# Patient Record
Sex: MALE | Race: WHITE | NOT HISPANIC OR LATINO | Employment: FULL TIME | URBAN - METROPOLITAN AREA
[De-identification: names, ages, dates, MRNs, and addresses within clinical notes are randomized per-mention and may not be internally consistent; named-entity substitution may affect disease eponyms.]

---

## 2018-11-14 ENCOUNTER — OFFICE VISIT (OUTPATIENT)
Dept: URGENT CARE | Facility: URGENT CARE | Age: 33
End: 2018-11-14
Payer: COMMERCIAL

## 2018-11-14 ENCOUNTER — RADIANT APPOINTMENT (OUTPATIENT)
Dept: GENERAL RADIOLOGY | Facility: CLINIC | Age: 33
End: 2018-11-14
Attending: FAMILY MEDICINE
Payer: COMMERCIAL

## 2018-11-14 VITALS
RESPIRATION RATE: 15 BRPM | WEIGHT: 216 LBS | DIASTOLIC BLOOD PRESSURE: 87 MMHG | SYSTOLIC BLOOD PRESSURE: 137 MMHG | HEART RATE: 93 BPM | TEMPERATURE: 98.9 F | OXYGEN SATURATION: 97 %

## 2018-11-14 DIAGNOSIS — R13.12 OROPHARYNGEAL DYSPHAGIA: Primary | ICD-10-CM

## 2018-11-14 DIAGNOSIS — R93.89 ABNORMAL X-RAY OF NECK: ICD-10-CM

## 2018-11-14 PROCEDURE — 99203 OFFICE O/P NEW LOW 30 MIN: CPT | Performed by: FAMILY MEDICINE

## 2018-11-14 PROCEDURE — 70360 X-RAY EXAM OF NECK: CPT | Mod: FY

## 2018-11-14 PROCEDURE — 71046 X-RAY EXAM CHEST 2 VIEWS: CPT | Mod: FY

## 2018-11-14 RX ORDER — TRAZODONE HYDROCHLORIDE 100 MG/1
100 TABLET ORAL
COMMUNITY
Start: 2018-11-09

## 2018-11-14 RX ORDER — CLOBETASOL PROPIONATE 0.5 MG/ML
LOTION TOPICAL
Refills: 2 | COMMUNITY
Start: 2018-10-09

## 2018-11-14 RX ORDER — AZITHROMYCIN 250 MG/1
TABLET, FILM COATED ORAL
COMMUNITY
Start: 2018-11-12

## 2018-11-14 RX ORDER — FLUTICASONE PROPIONATE 50 MCG
2 SPRAY, SUSPENSION (ML) NASAL
COMMUNITY
Start: 2018-10-08

## 2018-11-14 RX ORDER — HYDROXYZINE HYDROCHLORIDE 25 MG/1
25 TABLET, FILM COATED ORAL
COMMUNITY
Start: 2018-10-08 | End: 2018-11-14

## 2018-11-14 RX ORDER — ESCITALOPRAM OXALATE 20 MG/1
TABLET ORAL
Refills: 0 | COMMUNITY
Start: 2018-11-09

## 2018-11-14 RX ORDER — SUCRALFATE ORAL 1 G/10ML
1 SUSPENSION ORAL 4 TIMES DAILY
Qty: 420 ML | Refills: 1 | Status: SHIPPED | OUTPATIENT
Start: 2018-11-14

## 2018-11-14 RX ORDER — OMEPRAZOLE 40 MG/1
40 CAPSULE, DELAYED RELEASE ORAL DAILY
Qty: 30 CAPSULE | Refills: 1 | Status: SHIPPED | OUTPATIENT
Start: 2018-11-14

## 2018-11-14 ASSESSMENT — PAIN SCALES - GENERAL: PAINLEVEL: SEVERE PAIN (6)

## 2018-11-14 NOTE — MR AVS SNAPSHOT
"              After Visit Summary   2018    León Williamson    MRN: [id number]           Patient Information     Date Of Birth          1985        Visit Information        Provider Department      2018 8:50 PM Duyen Oconnell MD [facility]        Today's Diagnoses     Oropharyngeal dysphagia    -  1       Follow-ups after your visit        Who to contact     If you have questions or need follow up information about today's clinic visit or your schedule please contact [facility] directly at [phone].  Normal or non-critical lab and imaging results will be communicated to you by Mapadohart, letter or phone within 4 business days after the clinic has received the results. If you do not hear from us within 7 days, please contact the clinic through Mapadohart or phone. If you have a critical or abnormal lab result, we will notify you by phone as soon as possible.  Submit refill requests through [employer] or call your pharmacy and they will forward the refill request to us. Please allow 3 business days for your refill to be completed.          Additional Information About Your Visit        MyChart Information     [employer] lets you send messages to your doctor, view your test results, renew your prescriptions, schedule appointments and more. To sign up, go to [email or web address] . Click on \"Log in\" on the left side of the screen, which will take you to the Welcome page. Then click on \"Sign up Now\" on the right side of the page.     You will be asked to enter the access code listed below, as well as some personal information. Please follow the directions to create your username and password.     Your access code is: [email or web address]  Expires: 2019 10:30 PM     Your access code will  in 90 days. If you need help or a new code, please call your [facility] or [phone].        Care EveryWhere ID     This is your Care EveryWhere ID. This could be used by other " organizations to access your [place] medical records  [id number]        Your Vitals Were     Pulse Temperature Respirations Pulse Oximetry          93 98.9  F (37.2  C) (Oral) 15 97%         Blood Pressure from Last 3 Encounters:   11/14/18 137/87   07/12/11 150/88   07/09/11 123/75    Weight from Last 3 Encounters:   11/14/18 216 lb (98 kg)   07/12/11 185 lb 9.6 oz (84.2 kg)              We Performed the Following     XR Chest 2 Views     XR Neck Soft Tissue          Today's Medication Changes          These changes are accurate as of 11/14/18 10:30 PM.  If you have any questions, ask your nurse or doctor.               Start taking these medicines.        Dose/Directions    omeprazole 40 MG capsule   Commonly known as:  priLOSEC   Used for:  Oropharyngeal dysphagia   Started by:  Duyen Oconnell MD        Dose:  40 mg   Take 1 capsule (40 mg) by mouth daily Take 30-60 minutes before a meal.   Quantity:  30 capsule   Refills:  1       sucralfate 1 GM/10ML suspension   Commonly known as:  CARAFATE   Used for:  Oropharyngeal dysphagia   Started by:  Duyen Oconnell MD        Dose:  1 g   Take 10 mLs (1 g) by mouth 4 times daily   Quantity:  420 mL   Refills:  1            Where to get your medicines      These medications were sent to [facility] [address] [place] [id number] [facility] [address]  [address] [facility] [address]     Phone:  [phone]     omeprazole 40 MG capsule    sucralfate 1 GM/10ML suspension                Primary Care Provider Office Phone # Fax #    Roberto To [phone]       [address]  [facility] [address]        Equal Access to Services     DENA RAUSCH AH: Stanley Hair, waaxda luqadaha, qaybta kaalmada adeegyada, kennedy sandy. So [facility] [phone].    ATENCIÓN: Si habla español, tiene a villanueva disposición servicios gratuitos de asistencia lingüística.  Gloria holguin [phone].    We comply with applicable federal civil rights laws and [place] laws. We do not discriminate on the basis of race, color, national origin, age, disability, sex, sexual orientation, or gender identity.            Thank you!     Thank you for choosing [facility]  for your care. Our goal is always to provide you with excellent care. Hearing back from our patients is one way we can continue to improve our services. Please take a few minutes to complete the written survey that you may receive in the mail after your visit with us. Thank you!             Your Updated Medication List - Protect others around you: Learn how to safely use, store and throw away your medicines at www.disposemymeds.org.          This list is accurate as of 11/14/18 10:30 PM.  Always use your most recent med list.                   Brand Name Dispense Instructions for use Diagnosis    azithromycin 250 MG tablet    ZITHROMAX     2 tablets by mouth today, then 1 tablet daily for 4 days        clobetasol propionate 0.05 % Lotn      RAYNE EXT AA BID        D3-50 28228 units capsule   Generic drug:  cholecalciferol      Take 50,000 Units by mouth        escitalopram 20 MG tablet    LEXAPRO     TK 1 T PO QAM        fluticasone 50 MCG/ACT spray    FLONASE     Spray 2 sprays in nostril        omeprazole 40 MG capsule    priLOSEC    30 capsule    Take 1 capsule (40 mg) by mouth daily Take 30-60 minutes before a meal.    Oropharyngeal dysphagia       order for DME     1 Device    Aluminum foam splint    Boxing, Hand pain, right       sucralfate 1 GM/10ML suspension    CARAFATE    420 mL    Take 10 mLs (1 g) by mouth 4 times daily    Oropharyngeal dysphagia       traZODone 100 MG tablet    DESYREL     Take 100 mg by mouth        ZOLOFT PO      Take  by mouth.

## 2018-11-15 NOTE — NURSING NOTE
Chief Complaint   Patient presents with     Throat Problem     pt c/o pain in his throat, took Mucinex adn feels likeit got stuck 11/3/18       Initial /87  Pulse 93  Temp 98.9  F (37.2  C) (Oral)  Resp 15  Wt 216 lb (98 kg)  SpO2 97% There is no height or weight on file to calculate BMI.  Medication Reconciliation: ruperto Olvera MA

## 2018-11-15 NOTE — PROGRESS NOTES
"Chief complaint:  Feeling something stuck in throat    Yesterday swallowed a mucinex and felt like it got stuck in his throat like it didn't go all the way down.  He is being treated for bronchitis.     Since then has been able to eat and drink but it would hurt.  He has been increasingly anxious about taking his pills since then. Although he is able to keep himself hydrated, he is also worried about eating  Problem for patient is moderate to severe    \"I want to know what's going on\"    He hasn't tried any treatments yet  Worse with swallowing.  Better if not swallowing but would still be there.     No fevers or chills chest pain or shortness of breath     Problem list and histories reviewed & adjusted, as indicated.  Additional history: as documented    Problem list, Medication list, Allergies, and Medical/Social/Surgical histories reviewed in EPIC and updated as appropriate.    ROS:  Constitutional, HEENT, cardiovascular, pulmonary, gi and gu systems are negative, except as otherwise noted.    OBJECTIVE:                                                    /87  Pulse 93  Temp 98.9  F (37.2  C) (Oral)  Resp 15  Wt 216 lb (98 kg)  SpO2 97%  There is no height or weight on file to calculate BMI.  GENERAL: healthy, alert and no distress  EYES: Eyes grossly normal to inspection, PERRL and conjunctivae and sclerae normal  HENT: ear canals and TM's normal, nose and mouth without ulcers or lesions  NECK: no adenopathy, no asymmetry, masses, or scars and thyroid normal to palpation  RESP: lungs clear to auscultation - no rales, rhonchi or wheezes  CV: regular rate and rhythm, normal S1 S2, no S3 or S4, no murmur, click or rub, no peripheral edema and peripheral pulses strong  ABDOMEN: soft, nontender, no hepatosplenomegaly, no masses and bowel sounds normal  MS: no gross musculoskeletal defects noted, no edema  SKIN: no suspicious lesions or rashes  NEURO: Normal strength and tone, mentation intact and speech " normal  PSYCH: mentation appears normal, affect normal/bright    Diagnostic Test Results:  Results for orders placed or performed in visit on 11/14/18 (from the past 24 hour(s))   XR Chest 2 Views    Narrative    CHEST TWO VIEWS   11/14/2018 9:35 PM     HISTORY: Oropharyngeal dysphagia.    COMPARISON: 10/28/2009.    FINDINGS: The lungs are clear. Normal-sized cardiac silhouette.      Impression    IMPRESSION: No evidence of active cardiopulmonary disease.    SAMI GLASGOW MD   XR Neck Soft Tissue    Narrative    NECK SOFT TISSUE  11/14/2018 9:35 PM     HISTORY: ; Oropharyngeal dysphagia    COMPARISON: None.      Impression    IMPRESSION: The supraglottic soft tissues appear edematous. Laryngeal  edema could also have this appearance. Epiglottitis could potentially  have this appearance. If clinically indicated, CT scan of the neck  could be performed to confirm these findings. Retropharyngeal soft  tissues appear normal.    KALEB QUIROS MD        ASSESSMENT/PLAN:                                                        ICD-10-CM    1. Oropharyngeal dysphagia R13.12 XR Neck Soft Tissue     XR Chest 2 Views     sucralfate (CARAFATE) 1 GM/10ML suspension     omeprazole (PRILOSEC) 40 MG capsule     CANCELED: OTOLARYNGOLOGY REFERRAL   2. Abnormal x-ray of neck R93.89      On my initial review of xray I had read as negative - thought symptoms may be from pill esophagitis and discussed trial of carafate and prilosec  However patient was very anxious about his symptoms and went to ER. He was very concerned with worsening symptoms as well.   An hour later official xray report came back abnromal xray of neck. I had called patient and he was already in the ER waiting room.  I had relayed xray report and agree with ER evaluation. Recommend CT neck. Patient voiced understanding.    MD Duyen Brock MD  [facility]

## 2021-09-11 ENCOUNTER — HOSPITAL ENCOUNTER (EMERGENCY)
Facility: CLINIC | Age: 36
Discharge: HOME | End: 2021-09-11
Attending: EMERGENCY MEDICINE | Admitting: EMERGENCY MEDICINE
Payer: COMMERCIAL

## 2021-09-11 ENCOUNTER — NURSE TRIAGE (OUTPATIENT)
Dept: NURSING | Facility: CLINIC | Age: 36
End: 2021-09-11

## 2021-09-11 VITALS
DIASTOLIC BLOOD PRESSURE: 110 MMHG | SYSTOLIC BLOOD PRESSURE: 154 MMHG | RESPIRATION RATE: 18 BRPM | HEART RATE: 91 BPM | OXYGEN SATURATION: 96 % | TEMPERATURE: 98.2 F

## 2021-09-11 DIAGNOSIS — H16.002 CORNEAL ULCER OF LEFT EYE: ICD-10-CM

## 2021-09-11 DIAGNOSIS — H18.823 CONTACT LENS OVERWEAR OF BOTH EYES: ICD-10-CM

## 2021-09-11 PROCEDURE — 99284 EMERGENCY DEPT VISIT MOD MDM: CPT | Performed by: EMERGENCY MEDICINE

## 2021-09-11 PROCEDURE — 99283 EMERGENCY DEPT VISIT LOW MDM: CPT | Performed by: EMERGENCY MEDICINE

## 2021-09-11 RX ORDER — TETRACAINE HYDROCHLORIDE 5 MG/ML
2 SOLUTION OPHTHALMIC ONCE
Status: DISCONTINUED | OUTPATIENT
Start: 2021-09-11 | End: 2021-09-11 | Stop reason: HOSPADM

## 2021-09-11 RX ORDER — TOBRAMYCIN 3 MG/ML
1-2 SOLUTION/ DROPS OPHTHALMIC 4 TIMES DAILY
Qty: 5 ML | Refills: 0 | Status: SHIPPED | OUTPATIENT
Start: 2021-09-11

## 2021-09-12 ENCOUNTER — NURSE TRIAGE (OUTPATIENT)
Dept: NURSING | Facility: CLINIC | Age: 36
End: 2021-09-12

## 2021-09-12 NOTE — ED PROVIDER NOTES
History     Chief Complaint   Patient presents with     Eye Problem     HPI  León Williamson is a 35 year old male who presents to the emergency room with left eye pain.  Symptoms started this morning, noticed upon awakening.  He states that it feels similar to an ulcer he has had in his eye in the past.  Is a contact lens wearer.  Frequently sleeps in his contact lenses, which are soft lenses that are to be disposed of every 2 weeks and not worn overnight.  He usually changes them every 2 weeks, and last changed these particular context 2 weeks ago.  He took the left contact out this morning but continues to have eye pain.  Eye is watery but no matter or discharge.  Has not been running a fever.  Associated with photophobia.  He went to urgent care where he was told that he needed to be seen in the ER for further evaluation.  Does not have an eye doctor that he sees regularly.      Allergies:  No Known Allergies    Problem List:    There are no problems to display for this patient.       Past Medical History:    No past medical history on file.    Past Surgical History:    No past surgical history on file.    Family History:    No family history on file.    Social History:  Marital Status:  Single [1]  Social History     Tobacco Use     Smoking status: Current Every Day Smoker     Smokeless tobacco: Never Used   Substance Use Topics     Alcohol use: Yes     Comment: occasional     Drug use: No        Medications:    tobramycin (TOBREX) 0.3 % ophthalmic solution  azithromycin (ZITHROMAX) 250 MG tablet  clobetasol propionate 0.05 % LOTN  escitalopram (LEXAPRO) 20 MG tablet  fluticasone (FLONASE) 50 MCG/ACT spray  omeprazole (PRILOSEC) 40 MG capsule  ORDER FOR DME  Sertraline HCl (ZOLOFT PO)  sucralfate (CARAFATE) 1 GM/10ML suspension  traZODone (DESYREL) 100 MG tablet          Review of Systems   All other systems reviewed and are negative.      Physical Exam   BP: (!) 154/110  Pulse: 92  Temp: 98.2  F (36.8   C)  Resp: 18  SpO2: 95 %      Physical Exam  Vitals and nursing note reviewed.   Constitutional:       General: He is in acute distress.      Appearance: He is obese. He is not diaphoretic.      Comments: In pain, wearing sunglasses in darkened room   HENT:      Head: Atraumatic.   Eyes:      General:         Left eye: No foreign body.      Extraocular Movements: Extraocular movements intact.      Pupils: Pupils are equal, round, and reactive to light.      Left eye: No corneal abrasion. Oren exam negative.     Slit lamp exam:     Left eye: Photophobia present.   Cardiovascular:      Heart sounds: Normal heart sounds.   Pulmonary:      Effort: No respiratory distress.      Breath sounds: Normal breath sounds.   Abdominal:      General: Bowel sounds are normal.      Palpations: Abdomen is soft.      Tenderness: There is no abdominal tenderness.   Musculoskeletal:         General: No tenderness.   Skin:     General: Skin is warm.      Findings: No rash.   Neurological:      Mental Status: He is alert.         ED Course        Procedures              Critical Care time:  none               No results found for this or any previous visit (from the past 24 hour(s)).    Medications   tetracaine (PONTOCAINE) 0.5 % ophthalmic solution 2 drop (has no administration in time range)       Assessments & Plan (with Medical Decision Making)  León is a 35-year-old male who presents to the emergency room for left eye pain.  See history and focused physical exam as above  35-year-old male who is in mild distress secondary to pain, is sitting with sunglasses on, hat over his eyes and in a dark room.  After instilling tetracaine drops, able to examine his eye.  Pupil was reactive, pupils are equal sizes and round.  Extraocular movements were intact.  No obvious foreign body on fluorescein exam.  Based on the history, as well as per chart review and seeing the patient has history of frequent conjunctivitis and corneal ulcers due to  improper contact lens wear, suspect that this is another corneal ulcer.  Scribed tobramycin eyedrops for him to start and made a referral to ophthalmology in [place] for close follow-up.  He was instructed to leave contacts out until everything is healed.  May take NSAIDs for pain relief.  Return at any time if symptoms worsen.  Discharged in acute distress     I have reviewed the nursing notes.    I have reviewed the findings, diagnosis, plan and need for follow up with the patient.       Discharge Medication List as of 9/11/2021  8:03 PM      START taking these medications    Details   tobramycin (TOBREX) 0.3 % ophthalmic solution Place 1-2 drops Into the left eye 4 times daily, Disp-5 mL, R-0, [facility]eds             Final diagnoses:   Corneal ulcer of left eye   Contact lens overwear of both eyes       9/11/2021   [facility] EMERGENCY DEPT     Nanda Burgos,   09/11/21 8712

## 2021-09-12 NOTE — TELEPHONE ENCOUNTER
Joann (wife) calls and says that pt. Was in the ER yesterday for his left eye ulcer. Joann says that pt. Was given an eye drop but that is not helping pt. Joann says that pt. Cannot open his left eye and says that the left eye is very red. Joann says that she will take her  back to the [facility] ER now. COVID 19 Nurse Triage Plan/Patient Instructions    Please be aware that novel coronavirus (COVID-19) may be circulating in the community. If you develop symptoms such as fever, cough, or SOB or if you have concerns about the presence of another infection including coronavirus (COVID-19), please contact your health care provider or visit [email or web address].     Disposition/Instructions    ED Visit recommended. Follow protocol based instructions.     Bring Your Own Device:  Please also bring your smart device(s) (smart phones, tablets, laptops) and their charging cables for your personal use and to communicate with your care team during your visit.    Thank you for taking steps to prevent the spread of this virus.  o Limit your contact with others.  o Wear a simple mask to cover your cough.  o Wash your hands well and often.    Resources    [facility]: About COVID-19: [email or web address]    CDC: What to Do If You're Sick: www.cdc.gov/coronavirus/2019-ncov/about/steps-when-sick.html    CDC: Ending Home Isolation: www.cdc.gov/coronavirus/2019-ncov/hcp/disposition-in-home-patients.html     CDC: Caring for Someone: www.cdc.gov/coronavirus/2019-ncov/if-you-are-sick/care-for-someone.html     [facility]: Interim Guidance for Hospital Discharge to Home: www.health.[facility].[place]/diseases/coronavirus/hcp/hospdischarge.pdf    [facility] clinical trials (COVID-19 research studies): [email or web address].[facility]/umn-clinical-trials     Below are the COVID-19 hotlines at the [place] Department of Health ([facility]). Interpreters are available.   o For health questions: Call 717-621-7691  or 1-545.956.4279 (7 a.m. to 7 p.m.)  o For questions about schools and childcare: Call 565-290-5372 or 1-715.547.2828 (7 a.m. to 7 p.m.)                   Reason for Disposition    Severe eye pain    Additional Information    Negative: Chemical got in the eye    Negative: Piece of something got in the eye    Negative: Eye redness followed an eye injury    Negative: Yellow or green pus in the eyes    Negative: [1] Eyelid is swollen AND [2] no redness of white of eye (sclera)    Negative: Caused by pollen or other allergy OR the main symptom is itchy eyes    Negative: Red, widespread rash also present    Protocols used: EYE - RED WITHOUT PUS-A-AH

## 2021-09-12 NOTE — TELEPHONE ENCOUNTER
"    TRIAGE CALL     Patient calling to report eye pain   Phone # [phone]  please updated my phone #    He has been seen yesterday for a [facility] emergency department for Ulcer of his left eye.  Was given an eye drop but that is not helping says that pt. cannot open his left eye and says that the left eye is very red and painful  Pain 5/10 -     Medications taken today tobramycin (TOBREX) 0.3 % ophthalmic solution     Patient denies  fever, nausea, vomiting  Patient unable to open his eye at all     Per protocol, disposition to Go to ER [facility] not able to treat him he tried that yesterday     Care advise given, patients questions were answered  Patient verbalized understanding and agrees with plan    Trish Browning RN Nurse Triage Advisor 4:53 PM 9/12/2021     Looks like Pt's wife called earlier to FAN:  Joann (wife) calls and says that pt. Was in the ER yesterday for his left eye ulcer. Joann says that pt. Was given an eye drop but that is not helping pt. Joann says that pt. Cannot open his left eye and says that the left eye is very red. Joann says that she will take her  back to the [facility] ER now. COVID 19 Nurse Triage Plan/Patient Instructions    Reason for Disposition    [1] Eye pain/discomfort AND [2] more than mild    Additional Information    Negative: Followed an eye injury    Negative: Eye pain from chemical in the eye    Negative: Eye pain from foreign body in eye    Negative: [1] Tender, red lump or pimple AND [2] located along the eyelid margin    Negative: Has sinus pain or pressure    Negative: Severe eye pain    Negative: Complete loss of vision in one or both eyes    Negative: [1] Eyelids are very swollen (shut or almost) AND [2] fever    Negative: [1] Eyelid (outer) is very red AND [2] fever    Negative: [1] Foreign body sensation (\"feels like something is in there\") AND [2] irrigation didn't help    Negative: Vomiting    Negative: Ulcer or sore seen on the cornea (clear " center part of the eye)    Negative: [1] Recent eye surgery AND [2] increasing eye pain    Negative: [1] Blurred vision AND [2] new or worsening    Negative: Patient sounds very sick or weak to the triager    Protocols used: EYE PAIN-A-AH

## 2021-09-12 NOTE — DISCHARGE INSTRUCTIONS
It appears you have an ulcer forming on your cornea in your left eye from overuse of your contact lenses.    Since you have already remove the contact lens from your left eye, you should remove the one from your right eye as well.  Do not wear contact lenses until this is completely healed    A prescription for an antibiotic eyedrop was sent to the [facility] machine that you can fill here tonight.  You will need to use these drops 4 times daily for at least 1 week.    You may take over-the-counter ibuprofen 800 mg every 8 hours as needed for pain and inflammation    A referral to ophthalmology was also made tonight from the emergency room.  If you do not hear from them by Monday morning, you should contact the office at the number listed above.  They do have a clinic in [place] that you have been referred to for follow-up    Contact lens care and good hygiene is very important.  You need to remove soft contact lenses before sleeping and use appropriate cleaning solution    Return to the emergency room if you have any new or concerning symptoms that develop

## 2021-09-12 NOTE — TELEPHONE ENCOUNTER
Patient calling reporting he continues to have severe pain of his eye. Patient was seen at [facility] emergency department for Ulcer of his left eye. Per discharge summary advised patient to be seen back at the emergency department. Patient refused and states his discharge nurse Angela advised him to call back and requesting to speak with her. ISAURA jimenez transferred patient to [facility] Emergency Department to be connected with Angela DELAROSA.     Marquis Solis RN  [facility]

## 2023-03-21 ENCOUNTER — OFFICE VISIT (OUTPATIENT)
Dept: PLASTIC SURGERY | Facility: CLINIC | Age: 38
End: 2023-03-21

## 2023-03-21 DIAGNOSIS — Z41.1 ENCOUNTER FOR COSMETIC PROCEDURE: Primary | ICD-10-CM

## 2023-03-21 NOTE — PROGRESS NOTES
Facial Plastic and Reconstructive Surgery Cosmetic Consultation  03/21/23     Referring Provider:   Referred Self, MD  No address on file    HPI:   I had the pleasure of seeing León Williamson today in clinic for consultation for otoplasty.      León Williamson is a 37 year old male. He has had prominent ears his whole life.  He has been thinking about surgery for a while.  He denies any previous surgery on his ears.  No trauma to the ears.  Besides having his wisdom teeth out he has had no other operations.  He does sometimes get psoriasis inside of his ears.    Allergies: No known drug allergies  Medications: None  Medical conditions: None  Surgical history: wisdom teeth  Smoking: Yes, e-cigarettes.      PE:  Alert and Oriented, Answering Questions Appropriately  Atraumatic, Normocephalic, Face Symmetric  Skin: [place] 2  Facial Nerve Intact and facial movement symmetric  He has prominent ears bilaterally.  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  He also has a mildly obtuse auriculocephalic angle. Otherwise, anatomy is normal and favorable.     IMPRESSION/PLAN: León Lynn is a 37-year-old male who presents for evaluation of otoplasty.  He has had prominent ears his entire life.  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  He is a good candidate for otoplasty.  We discussed the procedure in detail, including suture techniques to develop his antihelical fold as well as conchal setback sutures.. Risks and benefits of the procedure were discussed, including but not limited to motor/sensory nerve damage (temporary and permanent), scarring, hematoma, irregularities of skin and underlying soft tissue, infection, asymmetry, and the need for additional procedures.     He does use e-cigarettes and I told him that in order to do this elective procedure he would need to be off of those for 3 months before and after the procedure.  We  discussed that this is due to the risk of poor healing and complications with using e-cigarettes.      Photodocumentation was obtained.

## 2023-03-21 NOTE — NURSING NOTE
Photodocumentation obtained.    Gave pt a cosmetic quote for Bilateral Otoplasty (see Media tab).    Discussed quote in great detail with pt, including the fact that the Anesthesia and Facility fees are an estimate based on time and may be subject to change.    Gave pt written materials on Otoplasty and what to expect post-op, as well as a list of medications to avoid prior to surgery.    Pt will call when/if ready to schedule.    Manju Baptiste RN  3/21/2023 9:06 AM